# Patient Record
Sex: FEMALE | Race: WHITE | NOT HISPANIC OR LATINO | Employment: STUDENT | URBAN - METROPOLITAN AREA
[De-identification: names, ages, dates, MRNs, and addresses within clinical notes are randomized per-mention and may not be internally consistent; named-entity substitution may affect disease eponyms.]

---
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
## 2024-02-12 ENCOUNTER — HOSPITAL ENCOUNTER (EMERGENCY)
Facility: HOSPITAL | Age: 19
Discharge: HOME | End: 2024-02-12
Attending: EMERGENCY MEDICINE
Payer: COMMERCIAL

## 2024-02-12 VITALS
SYSTOLIC BLOOD PRESSURE: 126 MMHG | OXYGEN SATURATION: 99 % | WEIGHT: 164.68 LBS | BODY MASS INDEX: 27.83 KG/M2 | DIASTOLIC BLOOD PRESSURE: 75 MMHG | TEMPERATURE: 98 F | HEART RATE: 79 BPM | RESPIRATION RATE: 18 BRPM

## 2024-02-12 DIAGNOSIS — M54.2 MUSCULOSKELETAL NECK PAIN: ICD-10-CM
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
## 2024-02-12 PROCEDURE — 99284 EMERGENCY DEPT VISIT MOD MDM: CPT | Performed by: EMERGENCY MEDICINE

## 2024-02-12 PROCEDURE — 99284 EMERGENCY DEPT VISIT MOD MDM: CPT

## 2024-02-12 PROCEDURE — 96372 THER/PROPH/DIAG INJ SC/IM: CPT

## 2024-02-12 RX ORDER — METHOCARBAMOL 500 MG/1
500 TABLET, FILM COATED ORAL 2 TIMES DAILY PRN
Qty: 10 TABLET | Refills: 0 | Status: SHIPPED | OUTPATIENT
Start: 2024-02-12

## 2024-02-12 RX ORDER — IBUPROFEN 600 MG/1
600 TABLET ORAL EVERY 6 HOURS PRN
Qty: 30 TABLET | Refills: 0 | Status: SHIPPED | OUTPATIENT
Start: 2024-02-12

## 2024-02-12 RX ORDER — KETOROLAC TROMETHAMINE 30 MG/ML
15 INJECTION, SOLUTION INTRAMUSCULAR; INTRAVENOUS ONCE
Status: COMPLETED | OUTPATIENT
Start: 2024-02-12 | End: 2024-02-12

## 2024-02-12 RX ORDER — ACETAMINOPHEN 325 MG/1
975 TABLET ORAL ONCE
Status: COMPLETED | OUTPATIENT
Start: 2024-02-12 | End: 2024-02-12

## 2024-02-12 RX ADMIN — ACETAMINOPHEN 975 MG: 325 TABLET, FILM COATED ORAL at 19:15

## 2024-02-12 RX ADMIN — KETOROLAC TROMETHAMINE 15 MG: 30 INJECTION, SOLUTION INTRAMUSCULAR; INTRAVENOUS at 19:16

## 2024-02-12 NOTE — Clinical Note
Asia Banks was seen and treated in our emergency department on 2/12/2024.                Diagnosis:     Asia  may return to school on return date.    She may return on this date: 02/14/2024         If you have any questions or concerns, please don't hesitate to call.      Tianna Becerril MD    ______________________________           _______________          _______________  Hospital Representative                              Date                                Time

## 2024-02-12 NOTE — Clinical Note
Asia Banks was seen and treated in our emergency department on 2/12/2024.                Diagnosis:     Asia  may return to work on return date.    She may return on this date: 02/14/2024         If you have any questions or concerns, please don't hesitate to call.      Tianna Becerril MD    ______________________________           _______________          _______________  Hospital Representative                              Date                                Time
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
## 2024-07-25 ENCOUNTER — OFFICE VISIT (OUTPATIENT)
Dept: OBGYN CLINIC | Facility: CLINIC | Age: 19
End: 2024-07-25
Payer: COMMERCIAL

## 2024-07-25 VITALS
SYSTOLIC BLOOD PRESSURE: 90 MMHG | WEIGHT: 167 LBS | HEIGHT: 66 IN | DIASTOLIC BLOOD PRESSURE: 60 MMHG | BODY MASS INDEX: 26.84 KG/M2

## 2024-07-25 DIAGNOSIS — Z01.419 WELL WOMAN EXAM WITH ROUTINE GYNECOLOGICAL EXAM: Primary | ICD-10-CM

## 2024-07-25 DIAGNOSIS — N92.1 BREAKTHROUGH BLEEDING ON NEXPLANON: ICD-10-CM

## 2024-07-25 DIAGNOSIS — Z97.5 BREAKTHROUGH BLEEDING ON NEXPLANON: ICD-10-CM

## 2024-07-25 PROBLEM — F90.9 ATTENTION DEFICIT HYPERACTIVITY DISORDER: Status: ACTIVE | Noted: 2024-07-25

## 2024-07-25 PROCEDURE — 99395 PREV VISIT EST AGE 18-39: CPT

## 2024-07-25 RX ORDER — ETONOGESTREL 68 MG/1
68 IMPLANT SUBCUTANEOUS
COMMUNITY

## 2024-07-25 RX ORDER — METHYLPHENIDATE HYDROCHLORIDE 27 MG/1
27 TABLET ORAL EVERY MORNING
COMMUNITY

## 2024-07-25 RX ORDER — NORETHINDRONE ACETATE AND ETHINYL ESTRADIOL AND FERROUS FUMARATE 1MG-20(24)
1 KIT ORAL DAILY
Qty: 84 TABLET | Refills: 1 | Status: SHIPPED | OUTPATIENT
Start: 2024-07-25

## 2024-07-25 NOTE — PROGRESS NOTES
ASSESSMENT & PLAN:   - Patient to begin  and take one pill everyday to regulate periods. We discussed risks/benefits of OCPs. Patient to f/u in 3 months.     Diagnoses and all orders for this visit:    Well woman exam with routine gynecological exam    Breakthrough bleeding on Nexplanon  -     norethindrone-ethinyl estradiol-ferrous fumarate (Junel Fe 24) 1-20 MG-MCG(24) per tablet; Take 1 tablet by mouth daily    Other orders  -     etonogestrel (Nexplanon) subdermal implant; Inject 68 mg under the skin Once every 3 years  -     methylphenidate (CONCERTA) 27 MG ER tablet; Take 27 mg by mouth every morning      The following were reviewed in today's visit: ASCCP guidelines (Pap screening at age 21), Gardisil vaccination, STD testing breast self exam, STD testing, exercise, and healthy diet.    Patient to return to office in yearly for annual exam.     All questions have been answered to her satisfaction.    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       HPI: Asiasuzanna Banks is a 19 y.o.  who presents for annual gynecologic examination.  She has the following concerns:  abnormal, irregular periods with Nexplanon. Nexplanon was placed 2022, this is patient's second Nexplanon. She notes that periods typically come monthly, twice monthly, or not at all and this varies every month. This has occurred in the past and she was put on Junel OCP, once her periods started to become regular, she stopped her OCP. She would like to go back on this.   Patient denies blood clots legs/lungs, migraine with aura, cigarette use.      Health Maintenance:    Exercise: intermittently  Breast exams/breast awareness: yes    Past Medical History:   Diagnosis Date    Kidney infection      History reviewed. No pertinent surgical history.    Past OB/Gyn History:  Period Cycle (Days): 14  Period Duration  (Days): 7  Period Pattern: (!) Irregular  Menstrual Flow: Heavy, Moderate, Light  Menstrual Control: Tampon, Panty liner, Thin pad  Dysmenorrhea: (!) Mild  Dysmenorrhea Symptoms: CrampingPatient's last menstrual period was 07/19/2024 (exact date).    Pap not indicated, start screening at age 21.   HPV vaccine completed: Unsure    Patient is currently sexually active.   STD testing: no  Current contraception:  Nexplanon    Family History  Family History   Problem Relation Age of Onset    No Known Problems Mother     No Known Problems Father     Hypertension Maternal Grandmother         Great grandmother    Colon cancer Maternal Grandfather         Great grandfather    Diabetes Paternal Grandmother     No Known Problems Half-Sister     No Known Problems Half-Sister     No Known Problems Half-Sister     No Known Problems Half-Brother     No Known Problems Half-Brother      Family history of uterine or ovarian cancer: no  Family history of breast cancer: yes, maternal aunt   Family history of colon cancer: yes    Social History:  Social History     Socioeconomic History    Marital status: Single     Spouse name: Not on file    Number of children: Not on file    Years of education: Not on file    Highest education level: Not on file   Occupational History    Not on file   Tobacco Use    Smoking status: Never    Smokeless tobacco: Never   Vaping Use    Vaping status: Never Used   Substance and Sexual Activity    Alcohol use: Never    Drug use: Never    Sexual activity: Yes     Partners: Male     Birth control/protection: Implant, Condom Male   Other Topics Concern    Not on file   Social History Narrative    Not on file     Social Determinants of Health     Financial Resource Strain: Not on file   Food Insecurity: Not on file   Transportation Needs: Not on file   Physical Activity: Not on file   Stress: Not on file   Social Connections: Not on file   Intimate Partner Violence: Not on file   Housing Stability: Not on file  "    Domestic violence screen: negative    Allergies:  No Known Allergies    Medications:    Current Outpatient Medications:     etonogestrel (Nexplanon) subdermal implant, Inject 68 mg under the skin Once every 3 years, Disp: , Rfl:     methylphenidate (CONCERTA) 27 MG ER tablet, Take 27 mg by mouth every morning, Disp: , Rfl:     norethindrone-ethinyl estradiol-ferrous fumarate (Junel Fe 24) 1-20 MG-MCG(24) per tablet, Take 1 tablet by mouth daily, Disp: 84 tablet, Rfl: 1    Review of Systems:  Review of Systems   Constitutional:  Negative for chills and fever.   Respiratory:  Negative for shortness of breath.    Cardiovascular:  Negative for chest pain.   Gastrointestinal:  Negative for abdominal pain, constipation and diarrhea.   Genitourinary:  Negative for dysuria, frequency, pelvic pain, vaginal discharge and vaginal pain.   Psychiatric/Behavioral:  Negative for self-injury and suicidal ideas.      Physical Exam:  BP 90/60 (BP Location: Right arm, Patient Position: Sitting, Cuff Size: Standard)   Ht 5' 5.5\" (1.664 m)   Wt 75.8 kg (167 lb)   LMP 07/19/2024 (Exact Date) Comment: pt getting period twice per month  BMI 27.37 kg/m²    Physical Exam  Constitutional:       Appearance: Normal appearance.   Genitourinary:      Genitourinary Comments: Pelvic and breast exam deferred due to age   HENT:      Head: Normocephalic and atraumatic.   Cardiovascular:      Rate and Rhythm: Normal rate and regular rhythm.      Heart sounds: Normal heart sounds. No murmur heard.  Pulmonary:      Effort: Pulmonary effort is normal.      Breath sounds: Normal breath sounds. No wheezing.   Abdominal:      General: Abdomen is flat.      Palpations: Abdomen is soft. There is no mass.      Tenderness: There is no abdominal tenderness.   Musculoskeletal:      Cervical back: Neck supple. No tenderness.   Lymphadenopathy:      Cervical: No cervical adenopathy.   Neurological:      General: No focal deficit present.      Mental Status: " She is alert and oriented to person, place, and time.   Psychiatric:         Mood and Affect: Mood normal.         Behavior: Behavior normal.   Vitals and nursing note reviewed.

## 2024-09-19 ENCOUNTER — HOSPITAL ENCOUNTER (EMERGENCY)
Facility: HOSPITAL | Age: 19
Discharge: HOME | End: 2024-09-19
Attending: EMERGENCY MEDICINE
Payer: COMMERCIAL

## 2024-09-19 VITALS
OXYGEN SATURATION: 99 % | SYSTOLIC BLOOD PRESSURE: 127 MMHG | DIASTOLIC BLOOD PRESSURE: 69 MMHG | RESPIRATION RATE: 18 BRPM | TEMPERATURE: 98.2 F | HEART RATE: 75 BPM

## 2024-09-19 DIAGNOSIS — K04.7 DENTAL INFECTION: ICD-10-CM

## 2024-09-19 DIAGNOSIS — K08.89 DENTALGIA: ICD-10-CM

## 2024-09-19 PROCEDURE — 99284 EMERGENCY DEPT VISIT MOD MDM: CPT

## 2024-09-19 PROCEDURE — 99282 EMERGENCY DEPT VISIT SF MDM: CPT

## 2024-09-19 RX ORDER — DIPHENHYDRAMINE HYDROCHLORIDE AND LIDOCAINE HYDROCHLORIDE AND ALUMINUM HYDROXIDE AND MAGNESIUM HYDRO
10 KIT EVERY 4 HOURS PRN
Qty: 119 ML | Refills: 0 | Status: SHIPPED | OUTPATIENT
Start: 2024-09-19

## 2024-09-19 RX ORDER — CELECOXIB 100 MG/1
100 CAPSULE ORAL 2 TIMES DAILY
Qty: 20 CAPSULE | Refills: 0 | Status: SHIPPED | OUTPATIENT
Start: 2024-09-19

## 2024-09-19 NOTE — ED PROVIDER NOTES
"1. Dental infection    2. Dentalgia      ED Disposition       ED Disposition   Discharge    Condition   Stable    Date/Time   Thu Sep 19, 2024  1:35 PM    Comment   Asia Banks discharge to home/self care.                   Assessment & Plan       Medical Decision Making  19-year-old female presenting today with concerns of left lower jaw pain and tooth pain.  Does not have a dentist she has.  Is concerned for infection.  On exam, infection and cellulitis without abscess.  Treat with antibiotics.  Phone numbers provided for dentistry.  Pain medication sent over to patient's pharmacy.  ------------------------------------------------------------  Strict return precautions discussed. Patient at time of discharge well-appearing in no acute distress, all questions answered. Patient agreeable to plan.  Patient's vitals, lab/imaging results, diagnosis, and treatment plan were discussed with the patient. All new/changed medications were discussed with patient, specifically, route of administration, how often and when to take, and where they can be picked up. Strict return precautions as well as close follow up with PCP was discussed with the patient and the patient was agreeable to my recommendations.  Patient verbally acknowledged understanding of the above communications. All labs reviewed and utilized in the medical decision making process (if labs were ordered). Portions of the record may have been created with voice recognition software.  Occasional wrong word or \"sound a like\" substitutions may have occurred due to the inherent limitations of voice recognition software.  Read the chart carefully and recognize, using context, where substitutions have occurred.      Risk  Prescription drug management.                     Medications - No data to display    History of Present Illness       19-year-old female presents today with 1 to 2 days of left lower dental pain.  No fever or chills.  No difficulty swallowing.  " No shortness of breath or chest pain.  No palpitations.  No taste of blood.  No other symptoms.        Review of Systems   Constitutional:  Negative for chills and fever.   HENT:  Positive for dental problem. Negative for ear pain and sore throat.    Eyes:  Negative for pain and visual disturbance.   Respiratory:  Negative for cough and shortness of breath.    Cardiovascular:  Negative for chest pain and palpitations.   Gastrointestinal:  Negative for abdominal pain and vomiting.   Genitourinary:  Negative for dysuria and hematuria.   Musculoskeletal:  Negative for arthralgias and back pain.   Skin:  Negative for color change and rash.   Neurological:  Negative for seizures and syncope.   All other systems reviewed and are negative.          Objective     ED Triage Vitals [09/19/24 1246]   Temperature Pulse Blood Pressure Respirations SpO2 Patient Position - Orthostatic VS   98.2 °F (36.8 °C) 75 127/69 18 99 % Sitting      Temp Source Heart Rate Source BP Location FiO2 (%) Pain Score    Oral Monitor Right arm -- --        Physical Exam  Vitals and nursing note reviewed.   Constitutional:       General: She is not in acute distress.     Appearance: She is well-developed.   HENT:      Head: Normocephalic and atraumatic.      Mouth/Throat:      Pharynx: Posterior oropharyngeal erythema (Cellulitis to the left lower gum without abscess.) present.   Eyes:      Conjunctiva/sclera: Conjunctivae normal.   Cardiovascular:      Rate and Rhythm: Normal rate and regular rhythm.      Heart sounds: No murmur heard.  Pulmonary:      Effort: Pulmonary effort is normal. No respiratory distress.      Breath sounds: Normal breath sounds.   Abdominal:      Palpations: Abdomen is soft.      Tenderness: There is no abdominal tenderness.   Musculoskeletal:         General: No swelling.      Cervical back: Neck supple.   Skin:     General: Skin is warm and dry.      Capillary Refill: Capillary refill takes less than 2 seconds.    Neurological:      Mental Status: She is alert.   Psychiatric:         Mood and Affect: Mood normal.         Labs Reviewed - No data to display  No orders to display       Procedures    ED Medication and Procedure Management   Prior to Admission Medications   Prescriptions Last Dose Informant Patient Reported? Taking?   etonogestrel (Nexplanon) subdermal implant  Self Yes No   Sig: Inject 68 mg under the skin Once every 3 years   methylphenidate (CONCERTA) 27 MG ER tablet  Self Yes No   Sig: Take 27 mg by mouth every morning   norethindrone-ethinyl estradiol-ferrous fumarate (Junel Fe 24) 1-20 MG-MCG(24) per tablet   No No   Sig: Take 1 tablet by mouth daily      Facility-Administered Medications: None     Discharge Medication List as of 9/19/2024  1:37 PM        START taking these medications    Details   amoxicillin-clavulanate (AUGMENTIN) 875-125 mg per tablet Take 1 tablet by mouth every 12 (twelve) hours for 7 days, Starting Thu 9/19/2024, Until Thu 9/26/2024, Normal      celecoxib (CeleBREX) 100 mg capsule Take 1 capsule (100 mg total) by mouth 2 (two) times a day, Starting Thu 9/19/2024, Normal      diphenhydramine, lidocaine, Al/Mg hydroxide, simethicone (Magic Mouthwash) SUSP Swish and spit 10 mL every 4 (four) hours as needed for mouth pain or discomfort, Starting Thu 9/19/2024, Normal           CONTINUE these medications which have NOT CHANGED    Details   etonogestrel (Nexplanon) subdermal implant Inject 68 mg under the skin Once every 3 years, Historical Med      methylphenidate (CONCERTA) 27 MG ER tablet Take 27 mg by mouth every morning, Historical Med      norethindrone-ethinyl estradiol-ferrous fumarate (Junel Fe 24) 1-20 MG-MCG(24) per tablet Take 1 tablet by mouth daily, Starting u 7/25/2024, Normal                Aníbal Lazar PA-C  09/19/24 2532

## 2024-09-19 NOTE — DISCHARGE INSTRUCTIONS
Magic mouthwash every 4 hours as needed for mouth pain. Take celebrex and augmentin twice a day for pain/infection, respectively.

## 2024-09-19 NOTE — Clinical Note
Asia Banks was seen and treated in our emergency department on 9/19/2024.                Diagnosis:     Asia  may return to work on return date, is off the rest of the shift today.    She may return on this date: 09/20/2024         If you have any questions or concerns, please don't hesitate to call.      Aníbal Lazar PA-C    ______________________________           _______________          _______________  Hospital Representative                              Date                                Time

## 2024-11-03 DIAGNOSIS — N92.1 BREAKTHROUGH BLEEDING ON NEXPLANON: ICD-10-CM

## 2024-11-03 DIAGNOSIS — Z97.5 BREAKTHROUGH BLEEDING ON NEXPLANON: ICD-10-CM

## 2024-11-04 RX ORDER — NORETHINDRONE ACETATE AND ETHINYL ESTRADIOL AND FERROUS FUMARATE 1MG-20(24)
1 KIT ORAL DAILY
Qty: 84 TABLET | Refills: 1 | Status: SHIPPED | OUTPATIENT
Start: 2024-11-04

## 2025-01-30 DIAGNOSIS — Z97.5 BREAKTHROUGH BLEEDING ON NEXPLANON: ICD-10-CM

## 2025-01-30 DIAGNOSIS — N92.1 BREAKTHROUGH BLEEDING ON NEXPLANON: ICD-10-CM

## 2025-01-31 RX ORDER — NORETHINDRONE ACETATE AND ETHINYL ESTRADIOL AND FERROUS FUMARATE 1MG-20(24)
1 KIT ORAL DAILY
Qty: 84 TABLET | Refills: 0 | OUTPATIENT
Start: 2025-01-31

## 2025-02-28 ENCOUNTER — TELEPHONE (OUTPATIENT)
Age: 20
End: 2025-02-28

## 2025-04-17 ENCOUNTER — ANNUAL EXAM (OUTPATIENT)
Dept: OBGYN CLINIC | Facility: CLINIC | Age: 20
End: 2025-04-17
Payer: COMMERCIAL

## 2025-04-17 VITALS
WEIGHT: 181 LBS | DIASTOLIC BLOOD PRESSURE: 70 MMHG | SYSTOLIC BLOOD PRESSURE: 102 MMHG | HEIGHT: 62 IN | BODY MASS INDEX: 33.31 KG/M2

## 2025-04-17 DIAGNOSIS — Z01.419 WELL WOMAN EXAM WITH ROUTINE GYNECOLOGICAL EXAM: Primary | ICD-10-CM

## 2025-04-17 DIAGNOSIS — Z12.4 ENCOUNTER FOR SCREENING FOR MALIGNANT NEOPLASM OF CERVIX: ICD-10-CM

## 2025-04-17 PROCEDURE — 99395 PREV VISIT EST AGE 18-39: CPT

## 2025-04-17 PROCEDURE — G0145 SCR C/V CYTO,THINLAYER,RESCR: HCPCS

## 2025-04-17 RX ORDER — DEXTROAMPHETAMINE SACCHARATE, AMPHETAMINE ASPARTATE MONOHYDRATE, DEXTROAMPHETAMINE SULFATE AND AMPHETAMINE SULFATE 5; 5; 5; 5 MG/1; MG/1; MG/1; MG/1
20 CAPSULE, EXTENDED RELEASE ORAL
COMMUNITY
Start: 2025-04-16 | End: 2025-06-15

## 2025-04-17 RX ORDER — PANTOPRAZOLE SODIUM 40 MG/1
40 TABLET, DELAYED RELEASE ORAL DAILY
COMMUNITY
Start: 2025-04-16 | End: 2025-06-15

## 2025-04-17 NOTE — PROGRESS NOTES
ASSESSMENT & PLAN:   Diagnoses and all orders for this visit:    Well woman exam with routine gynecological exam  -     Liquid-based pap, screening    Encounter for screening for malignant neoplasm of cervix  -     Liquid-based pap, screening    Other orders  -     amphetamine-dextroamphetamine (ADDERALL XR) 20 MG 24 hr capsule; Take 20 mg by mouth  -     pantoprazole (PROTONIX) 40 mg tablet; Take 40 mg by mouth daily      The following were reviewed in today's visit: ASCCP guidelines, Gardisil vaccination, STD testing breast self exam, STD testing, exercise, and healthy diet.    Patient to return to office in yearly for annual exam.     All questions have been answered to her satisfaction.    CC:  Annual Gynecologic Examination  Chief Complaint   Patient presents with    Gynecologic Exam     The patient is here for a yearly exam. Nexplanon 2022  Irregular periods. The patient is having her period more than once a month.   She has heavy periods at times with clotting. She has had irregular periods before the nexplenon.   No vaginal, bowel, bladder or breast problems.        HPI: Asia Banks is a 20 y.o.  who presents for annual gynecologic examination.  She has the following concerns:  continuation of irregular menses. She has random bleeding x 2 years, often a full period twice monthly. LMP 25. She does have Nexplanon in place, inserted 2022 and has an appt for removal/reinsertion on 5/15/2025. She tried OCPs, Junel to help with bleeding in the past but she did not feel this helped symptoms so she is not currently taking this.     Health Maintenance:    Exercise: intermittently  Breast exams/breast awareness: yes    Past Medical History:   Diagnosis Date    Kidney infection        History reviewed. No pertinent surgical history.    Past OB/Gyn History:   Patient's last menstrual period was 2025 (exact date).    Last Pap: N/A  HPV vaccine completed: unknown    Patient is currently  sexually active.   STD testing: no  Current contraception:Nexplanon, expires 5/2025      Family History  Family History   Problem Relation Age of Onset    No Known Problems Mother     No Known Problems Father     Hypertension Maternal Grandmother         Great grandmother    Colon cancer Maternal Grandfather         Great grandfather    Diabetes Paternal Grandmother     No Known Problems Half-Sister     No Known Problems Half-Sister     No Known Problems Half-Sister     No Known Problems Half-Brother     No Known Problems Half-Brother        Family history of uterine or ovarian cancer: no  Family history of breast cancer: no  Family history of colon cancer: yes    Social History:  Social History     Socioeconomic History    Marital status: Single     Spouse name: Not on file    Number of children: Not on file    Years of education: Not on file    Highest education level: Not on file   Occupational History    Not on file   Tobacco Use    Smoking status: Never    Smokeless tobacco: Never   Vaping Use    Vaping status: Never Used   Substance and Sexual Activity    Alcohol use: Not Currently    Drug use: Never    Sexual activity: Yes     Partners: Male     Birth control/protection: Implant, Condom Male   Other Topics Concern    Not on file   Social History Narrative    Not on file     Social Drivers of Health     Financial Resource Strain: Not At Risk (4/13/2025)    Received from [facility]    Financial Insecurity     In the last 12 months did you skip medications to save money?: No     In the last 12 months was there a time when you needed to see a doctor but could not because of cost?: No   Food Insecurity: No Food Insecurity (4/13/2025)    Received from [facility]    Food Insecurity     In the last 12 months did you ever eat less than you felt you should because there wasn't enough money for food?: No   Transportation Needs: No Transportation Needs (4/13/2025)    Received from  [facility]    Transportation Needs     In the last 12 months have you ever had to go without healthcare because you didn't have a way to get there?: No   Physical Activity: Not on file   Stress: Not on file   Social Connections: Socially Integrated (4/13/2025)    Received from [facility]    Social Connection     Do you often feel lonely?: No   Intimate Partner Violence: Not on file   Housing Stability: Not At Risk (4/13/2025)    Received from [facility]    Housing Stability     Are you worried that in the next 2 months you may not have stable housing?: No     Domestic violence screen: negative    Allergies:  No Known Allergies    Medications:    Current Outpatient Medications:     amphetamine-dextroamphetamine (ADDERALL XR) 20 MG 24 hr capsule, Take 20 mg by mouth, Disp: , Rfl:     celecoxib (CeleBREX) 100 mg capsule, Take 1 capsule (100 mg total) by mouth 2 (two) times a day, Disp: 20 capsule, Rfl: 0    etonogestrel (Nexplanon) subdermal implant, Inject 68 mg under the skin Once every 3 years, Disp: , Rfl:     methylphenidate (CONCERTA) 27 MG ER tablet, Take 27 mg by mouth every morning, Disp: , Rfl:     pantoprazole (PROTONIX) 40 mg tablet, Take 40 mg by mouth daily, Disp: , Rfl:     diphenhydramine, lidocaine, Al/Mg hydroxide, simethicone (Magic Mouthwash) SUSP, Swish and spit 10 mL every 4 (four) hours as needed for mouth pain or discomfort (Patient not taking: Reported on 4/17/2025), Disp: 119 mL, Rfl: 0    norethindrone-ethinyl estradiol-ferrous fumarate (Junel Fe 24) 1-20 MG-MCG(24) per tablet, Take 1 tablet by mouth daily (Patient not taking: Reported on 4/17/2025), Disp: 84 tablet, Rfl: 1    Review of Systems:  Review of Systems   Constitutional:  Negative for chills and fever.   Respiratory:  Negative for shortness of breath.    Cardiovascular:  Negative for chest pain.   Gastrointestinal:  Negative for abdominal pain, constipation and diarrhea.  "  Genitourinary:  Positive for menstrual problem. Negative for dysuria, frequency, pelvic pain, vaginal discharge and vaginal pain.   Psychiatric/Behavioral:  Negative for self-injury and suicidal ideas.      Physical Exam:  /70   Ht 5' 2\" (1.575 m)   Wt 82.1 kg (181 lb)   LMP 04/04/2025 (Exact Date)   BMI 33.11 kg/m²    Physical Exam  Constitutional:       Appearance: Normal appearance.   Genitourinary:      Vulva and urethral meatus normal.      No labial fusion noted.      No vaginal erythema or tenderness.        Right Adnexa: not tender.     Left Adnexa: not tender.     No cervical discharge or friability.      Uterus is not tender.   Breasts:     Breasts are symmetrical.      Right: Normal.      Left: Normal.   HENT:      Head: Normocephalic and atraumatic.   Neck:      Thyroid: No thyroid mass or thyroid tenderness.   Cardiovascular:      Rate and Rhythm: Normal rate and regular rhythm.      Heart sounds: Normal heart sounds. No murmur heard.  Pulmonary:      Effort: Pulmonary effort is normal.      Breath sounds: Normal breath sounds. No wheezing.   Abdominal:      Palpations: Abdomen is soft. There is no mass.      Tenderness: There is no abdominal tenderness.   Lymphadenopathy:      Cervical: No cervical adenopathy.   Neurological:      General: No focal deficit present.      Mental Status: She is alert and oriented to person, place, and time.   Skin:     General: Skin is warm and dry.   Psychiatric:         Mood and Affect: Mood normal.         Behavior: Behavior normal.   Vitals and nursing note reviewed.                              "

## 2025-04-23 ENCOUNTER — RESULTS FOLLOW-UP (OUTPATIENT)
Dept: OBGYN CLINIC | Facility: CLINIC | Age: 20
End: 2025-04-23

## 2025-04-23 LAB
LAB AP GYN PRIMARY INTERPRETATION: NORMAL
Lab: NORMAL

## 2025-05-15 ENCOUNTER — PROCEDURE VISIT (OUTPATIENT)
Dept: OBGYN CLINIC | Facility: CLINIC | Age: 20
End: 2025-05-15

## 2025-05-15 VITALS
SYSTOLIC BLOOD PRESSURE: 112 MMHG | WEIGHT: 182.6 LBS | BODY MASS INDEX: 29.35 KG/M2 | HEIGHT: 66 IN | DIASTOLIC BLOOD PRESSURE: 64 MMHG

## 2025-05-15 DIAGNOSIS — Z30.46 ENCOUNTER FOR REMOVAL AND REINSERTION OF NEXPLANON: Primary | ICD-10-CM

## 2025-05-15 LAB — SL AMB POCT URINE HCG: NEGATIVE

## 2025-05-15 NOTE — PROGRESS NOTES
Remove and insert drug implant    Date/Time: 5/15/2025 11:00 AM    Performed by: Reva Estevez PA-C  Authorized by: Reva Estevez PA-C    Consent:       Consent obtained:  Written      Consent given by:  Patient      Procedural risks discussed:  Bleeding, damage to other organs and infection      Patient questions answered:  yes       Patient agrees, verbalizes understanding, and wants to proceed:  yes    Indication:       Indication:  insertion of non-biodegradable drug delivery implant    Pre-procedure:       Prepped with:  alcohol 70% and povidone-iodine       Local anesthetic:  Lidocaine with epinephrine      The site was cleaned and prepped in a sterile fashion:  yes    Procedure:       Procedure:  Removal with reinsertion      Left/right:  Left      The patient was position surpine with their arm externally rotated and flexed at the elbow with their hand behind their head. The insertion site was chosen 8-10 cm medial to the medical epicondyle, and 3-5 cm posterior the sulcus between the bicep and tricep.:  Yes       Small stab incision was made in arm:  yes       The tip of the implant emerged from the skin incision:  Yes       Adherent tissue was removed with blunt dissection:  Yes       A tissue sheath was encountered and gently incised :  Yes       The implant was grasped with forceps:  Yes       The implant was removed  :  Yes       Preloaded contraceptive capsule trocar was placed subdermally:  yes       Contraceptive capsule was inserted and trocar removed:  yes       Palpation confirms placement by provider and patient:  yes       Site was closed and/or dressed with:  Steri-strips      Patient tolerated procedure well:  Patient tolerated procedure well       Lot Number:  [id number]      Expiration Date:  6/30/2027   Comments:       Patient presents to the office for Nexplanon removal due to expiry of current device and desire for reinsertion. The implant was palpated by patient and provider in her left  arm. The area was cleaned with alcohol and inflitrated with local anesthetic. The area was then prepped in sterile fashion using povidone-iodine. A small stab incision was made near the distal end of the implant, and the implant was visualized. The implant was grasped with hemostats and removed intact through the incision. A preloaded contraceptive capsule trocar was placed subdermally and new implant deployed. Patient and provider palpated the device after the procedure.  The incision site was closed with steri-strips and a pressure dressing was applied. The patient was counseled to leave pressure dressing in place for 24 hours and to keep incision covered for the next few days with a band-aid. All questions were answered to patient satisfaction. She was instructed to use backup protection for at 4 weeks following insertion to prevent pregnancy and should continue to use condoms to prevent STI. Patient to call with any questions.